# Patient Record
(demographics unavailable — no encounter records)

---

## 2024-10-17 NOTE — PLAN
[FreeTextEntry1] : Check labs Received Flu vaccine at work UTD with PAP smear Iron levels ENT referral given symptoms.

## 2024-10-17 NOTE — PHYSICAL EXAM
[No Acute Distress] : no acute distress [Well Nourished] : well nourished [Normal Sclera/Conjunctiva] : normal sclera/conjunctiva [PERRL] : pupils equal round and reactive to light [Normal Outer Ear/Nose] : the outer ears and nose were normal in appearance [Normal Oropharynx] : the oropharynx was normal [No JVD] : no jugular venous distention [No Lymphadenopathy] : no lymphadenopathy [Supple] : supple [No Respiratory Distress] : no respiratory distress  [Clear to Auscultation] : lungs were clear to auscultation bilaterally [Normal Rate] : normal rate  [Regular Rhythm] : with a regular rhythm [No Edema] : there was no peripheral edema [Soft] : abdomen soft [Non Tender] : non-tender [Non-distended] : non-distended [No HSM] : no HSM [Normal Bowel Sounds] : normal bowel sounds [Normal Posterior Cervical Nodes] : no posterior cervical lymphadenopathy [Normal Anterior Cervical Nodes] : no anterior cervical lymphadenopathy [No CVA Tenderness] : no CVA  tenderness [No Spinal Tenderness] : no spinal tenderness [No Joint Swelling] : no joint swelling [No Rash] : no rash [No Focal Deficits] : no focal deficits [Normal Gait] : normal gait [Normal Affect] : the affect was normal [Normal Insight/Judgement] : insight and judgment were intact

## 2024-10-17 NOTE — HISTORY OF PRESENT ILLNESS
[de-identified] : 43 yr old female here for CPE. She has some fatigue and reports heavy menses. She also has been experiencing periodic ringing in her ears and dizziness on and off since 2 years.

## 2024-10-17 NOTE — HEALTH RISK ASSESSMENT
[Good] : ~his/her~  mood as  good [No] : No [Never] : Never [Patient reported PAP Smear was normal] : Patient reported PAP Smear was normal [Nearly Every Day (3)] : 5.) Poor appetite or overeating? Nearly every day [Several Days (1)] : 7.) Trouble concentrating on things, such as reading a newspaper or watching television? Several days [Not at All (0)] : 9.) Thoughts that you would be off dead or of hurting yourself in some way? Not at all [Not at all] : How difficult have these problems made it for you to do your work, take care of things at home, or get along with people? Not at all [With Family] : lives with family [] :  [PapSmearDate] : 08/2024

## 2024-10-17 NOTE — REVIEW OF SYSTEMS
[Fatigue] : fatigue [Headache] : no headache [Dizziness] : dizziness [Unsteady Walking] : no ataxia [Negative] : Heme/Lymph

## 2024-12-03 NOTE — REVIEW OF SYSTEMS
[Ear Pain] : ear pain [Ear Itch] : ear itch [Dizziness] : dizziness [Vertigo] : vertigo [Ear Drainage] : ear drainage [Negative] : Heme/Lymph

## 2024-12-03 NOTE — DATA REVIEWED
[de-identified] : Audio 12/3/24: -Type B Tymps AU -Hearing WNL to mild SNHL @ 0190-4813 Hz AU -100% discrimination at 50 dB AU

## 2024-12-03 NOTE — ADDENDUM
[FreeTextEntry1] :  Documented by Fernandez Damian acting as scribe for Dr. Alston on 12/03/2024. All Medical record entries made by the Scribe were at my, Dr. Alston, direction and personally dictated by me on 12/03/2024 . I have reviewed the chart and agree that the record accurately reflects my personal performance of the history, physical exam, assessment and plan. I have also personally directed, reviewed, and agreed with the discharge instructions.

## 2024-12-03 NOTE — CONSULT LETTER
[Dear  ___] : Dear  [unfilled], [Consult Letter:] : I had the pleasure of evaluating your patient, [unfilled]. [Please see my note below.] : Please see my note below. [Consult Closing:] : Thank you very much for allowing me to participate in the care of this patient.  If you have any questions, please do not hesitate to contact me. [Sincerely,] : Sincerely, [FreeTextEntry3] :  Kyle Alston MD FACS

## 2024-12-03 NOTE — ASSESSMENT
[FreeTextEntry1] : Reviewed and reconciled medications, allergies, PMHx, PSHx, SocHx, FMHx.  Patient presents today stating that she has had bilateral non-pulsatile tinnitus for the past 2 years. She states that she has gotten lightheaded/dizziness a few times when bending over or getting up from a squatting position. She denies headaches. She denies visual issues. She denies having a history of loud noise exposure. She states that her father has hearing aids. She denies snoring at night.   Physical exam: -NOSE score 0 -TNSS 1 -right ear canal: minimal cerumen removed via curettage -tuning forks lateralizes to the right ear -Air>Bone on Rinnie's bilaterally -deviated septum -inflamed turbinates -dry blood intranasally on the left -class 1 tonsils -type 3 oral cavity -pupils are equal and reactive -no nystagmus -horizontal head roll: negative -vertical head roll: negative -Romberg: negative, but unsteady  Audio 12/3/24: -Type B Tymps AU -Hearing WNL to mild SNHL @ 6236-0989 Hz AU -100% discrimination at 50 dB AU   Plan:  Audio - results interpreted by Dr. Alston and reviewed with the patient. -Start Dymista - 1 spray bilaterally BID, spray laterally -Ordered ABR, ECOG, and VNG -FU with results from ABR, ECOG, and VNG

## 2024-12-03 NOTE — PHYSICAL EXAM
[Rinne Test Air Conduction Persists > Bone Conduction Right] : air conduction greater than bone conduction on the right [Rinne Test Air Conduction Persists > Bone Conduction Left] : air conduction greater than bone conduction on the left [Hearing Santillan Test (Tuning Fork On Forehead)] : no lateralization of tone [Midline] : trachea located in midline position [Normal] : no rashes [Hearing Loss Right Only] : normal [Hearing Loss Left Only] : normal [FreeTextEntry8] :  minimal cerumen removed via curettage [de-identified] : inflamed turbinates [de-identified] : dry blood intranasally on the left [de-identified] : class 1 [de-identified] : type 3 oral cavity [] : Romberg test is negative [FreeTextEntry2] :  sinuses nontender to percussion.  sensations intact [de-identified] : pupils are equal and reactive [de-identified] : but unsteady

## 2024-12-03 NOTE — HISTORY OF PRESENT ILLNESS
[de-identified] : Patient presents today stating that she has had bilateral non-pulsatile tinnitus for the past 2 years. She states that she has gotten lightheaded/dizziness a few times when bending over or getting up from a squatting position. She denies headaches. She denies visual issues. She denies having a history of loud noise exposure. She states that her father has hearing aids. She denies snoring at night.

## 2025-02-13 NOTE — PLAN
[FreeTextEntry1] : Rec GYN follow up for heavy cycles. Hematology referral Check labs Oral iron supplements for now.

## 2025-02-13 NOTE — HISTORY OF PRESENT ILLNESS
[de-identified] : 43 yr old female here for follow up for anemia. Continues to have heavy menses, yet to follow hematology. Feels fatigued

## 2025-03-10 NOTE — HISTORY OF PRESENT ILLNESS
[FreeTextEntry1] : This 42 yo P2 presents to discuss contraception; known hx of adenomyosis with chronic heavy menstrual cycle; presents today due to recent "scare" about being pregnant after menstrual cycle was 9 days late; never accepted OCP or IUD in the past. Using condoms at present. On Iron supplements- Ferritin= 23, was 7 back in October 2024. Declines exam, only wants to talk about contraception options. Medical hx reviewed.

## 2025-03-10 NOTE — PLAN
[FreeTextEntry1] : Reviewed RBA to the Mirena IUD- reviewed concern for starting OCP in a 44 yo with elevated BP readings on the chart, as well as benefit of the IUD for both contraception and menorrhagia Mirena IUD literature in hand Will task Billing Dept for coverage Pt to schedule appointment